# Patient Record
Sex: FEMALE | Race: WHITE | NOT HISPANIC OR LATINO | Employment: OTHER | URBAN - METROPOLITAN AREA
[De-identification: names, ages, dates, MRNs, and addresses within clinical notes are randomized per-mention and may not be internally consistent; named-entity substitution may affect disease eponyms.]

---

## 2020-10-20 ENCOUNTER — CORNEA CONSULT (OUTPATIENT)
Dept: URBAN - METROPOLITAN AREA CLINIC 39 | Facility: CLINIC | Age: 45
End: 2020-10-20

## 2020-10-20 DIAGNOSIS — H52.203: ICD-10-CM

## 2020-10-20 DIAGNOSIS — H11.001: ICD-10-CM

## 2020-10-20 PROCEDURE — 92134 CPTRZ OPH DX IMG PST SGM RTA: CPT

## 2020-10-20 PROCEDURE — 92025 CPTRIZED CORNEAL TOPOGRAPHY: CPT

## 2020-10-20 PROCEDURE — 92015 DETERMINE REFRACTIVE STATE: CPT

## 2020-10-20 PROCEDURE — 92014 COMPRE OPH EXAM EST PT 1/>: CPT

## 2020-10-20 PROCEDURE — 92286 ANT SGM IMG I&R SPECLR MIC: CPT

## 2020-10-20 PROCEDURE — 92250 FUNDUS PHOTOGRAPHY W/I&R: CPT

## 2020-10-20 RX ORDER — MOXIFLOXACIN HYDROCHLORIDE 5 MG/ML: 1 SOLUTION/ DROPS OPHTHALMIC

## 2020-10-20 RX ORDER — PREDNISOLONE ACETATE 10 MG/ML: 1 SUSPENSION/ DROPS OPHTHALMIC

## 2020-10-20 ASSESSMENT — VISUAL ACUITY
OD_SC: 20/40
OS_CC: 20/25
OD_CC: 20/25-2
OS_SC: 20/25
OD_SC: J2
OS_SC: J1

## 2020-10-20 ASSESSMENT — TONOMETRY
OS_IOP_MMHG: 14
OD_IOP_MMHG: 22

## 2020-10-21 ENCOUNTER — ESTABLISHED PATIENT (OUTPATIENT)
Dept: URBAN - METROPOLITAN AREA SURGERY 14 | Facility: SURGERY | Age: 45
End: 2020-10-21

## 2020-10-21 ENCOUNTER — SURGERY/PROCEDURE (OUTPATIENT)
Dept: URBAN - METROPOLITAN AREA CLINIC 39 | Facility: CLINIC | Age: 45
End: 2020-10-21

## 2020-10-21 DIAGNOSIS — H11.001: ICD-10-CM

## 2020-10-21 DIAGNOSIS — H52.203: ICD-10-CM

## 2020-10-21 PROCEDURE — 65426 REMOVAL OF EYE LESION: CPT

## 2020-10-21 PROCEDURE — 99211HP H&P OFFICE/OUTPATIENT VISIT, EST

## 2020-10-22 ENCOUNTER — 1 DAY POST-OP (OUTPATIENT)
Dept: URBAN - METROPOLITAN AREA CLINIC 39 | Facility: CLINIC | Age: 45
End: 2020-10-22

## 2020-10-22 DIAGNOSIS — Z98.890: ICD-10-CM

## 2020-10-22 PROCEDURE — 66999PO NON CO-MANAGED OTHER SURGERY PO

## 2020-10-22 ASSESSMENT — VISUAL ACUITY: OD_SC: 20/70

## 2020-10-22 ASSESSMENT — TONOMETRY: OD_IOP_MMHG: 21

## 2020-11-13 NOTE — PROCEDURE NOTE: CLINICAL
PROCEDURE NOTE: Punctal Plugs, Silicone #1 OD. Diagnosis: Keratoconjunctivitis Sicca, Not Specified As Sjögren's. Anesthesia: Topical. Prep: Antibiotic Drops q 5min x 3. Prior to treatment, the risks/benefits/alternatives were discussed. The patient wished to proceed with procedure. One drop of proparacaine was placed and a drop of lidocaine gel was placed over the puncta. 0.6 mm permanent silicone plugs were inserted in RUL. Patient tolerated procedure well. There were no complications. Post procedure instructions given. Queen Luis

## 2024-03-19 NOTE — PATIENT DISCUSSION
Good postoperative appearance. b/p elevated  losartan added Low Na diet   Would start losartan 25mg qd  Replace k to 4

## 2024-08-19 NOTE — PATIENT DISCUSSION
Good postoperative appearance. How Severe Is It?: moderate Is This A New Presentation, Or A Follow-Up?: Rash